# Patient Record
Sex: MALE | Race: WHITE | Employment: OTHER | URBAN - METROPOLITAN AREA
[De-identification: names, ages, dates, MRNs, and addresses within clinical notes are randomized per-mention and may not be internally consistent; named-entity substitution may affect disease eponyms.]

---

## 2018-04-10 PROCEDURE — 88305 TISSUE EXAM BY PATHOLOGIST: CPT | Performed by: INTERNAL MEDICINE

## 2024-05-15 ENCOUNTER — HOSPITAL ENCOUNTER (OUTPATIENT)
Age: 64
Discharge: HOME | End: 2024-05-15

## 2024-05-15 VITALS
OXYGEN SATURATION: 98 % | SYSTOLIC BLOOD PRESSURE: 127 MMHG | DIASTOLIC BLOOD PRESSURE: 80 MMHG | HEART RATE: 65 BPM | TEMPERATURE: 97 F | RESPIRATION RATE: 18 BRPM

## 2024-05-15 DIAGNOSIS — S61.212A LACERATION OF RIGHT MIDDLE FINGER WITHOUT FOREIGN BODY WITHOUT DAMAGE TO NAIL, INITIAL ENCOUNTER: ICD-10-CM

## 2024-05-15 DIAGNOSIS — Z23 NEED FOR TDAP VACCINATION: ICD-10-CM

## 2024-05-15 DIAGNOSIS — S61.210A LACERATION OF RIGHT INDEX FINGER WITHOUT FOREIGN BODY WITHOUT DAMAGE TO NAIL, INITIAL ENCOUNTER: Primary | ICD-10-CM

## 2024-05-15 PROCEDURE — 12001 RPR S/N/AX/GEN/TRNK 2.5CM/<: CPT | Performed by: PHYSICIAN ASSISTANT

## 2024-05-15 PROCEDURE — 90715 TDAP VACCINE 7 YRS/> IM: CPT | Performed by: PHYSICIAN ASSISTANT

## 2024-05-15 PROCEDURE — 90471 IMMUNIZATION ADMIN: CPT | Performed by: PHYSICIAN ASSISTANT

## 2024-05-15 PROCEDURE — 99213 OFFICE O/P EST LOW 20 MIN: CPT | Performed by: PHYSICIAN ASSISTANT

## 2024-05-15 RX ORDER — LIDOCAINE HYDROCHLORIDE 10 MG/ML
5 INJECTION, SOLUTION EPIDURAL; INFILTRATION; INTRACAUDAL; PERINEURAL ONCE
Status: COMPLETED | OUTPATIENT
Start: 2024-05-15 | End: 2024-05-15

## 2024-05-16 NOTE — ED PROVIDER NOTES
Patient Seen in: [facility]      History     Chief Complaint   Patient presents with    Laceration/Abrasion    Laceration     Stated Complaint:     Subjective:   HPI    Patient is a healthy 64-year-old male that presents to immediate care due to lacerations that he sustained prior to arrival.  Patient states that he was putting a  away that accidentally turned on.  States that he cut his right second and third digit on the blade.  Denies limited range of motion or strength.  Last Tdap in 2009.    Objective:   Past Medical History:    Hyperlipidemia    Thyroid disease              History reviewed. No pertinent surgical history.             Social History     Socioeconomic History    Marital status:    Tobacco Use    Smoking status: Never     Passive exposure: Never    Smokeless tobacco: Never   Vaping Use    Vaping status: Never Used              Review of Systems    Positive for stated complaint:   Other systems are as noted in HPI.  Constitutional and vital signs reviewed.      All other systems reviewed and negative except as noted above.    Physical Exam     ED Triage Vitals [05/15/24 1927]   /80   Pulse 65   Resp 18   Temp 97.3 °F (36.3 °C)   Temp src Temporal   SpO2 98 %   O2 Device None (Room air)       Current Vitals:   Vital Signs  BP: 127/80  Pulse: 65  Resp: 18  Temp: 97.3 °F (36.3 °C)  Temp src: Temporal    Oxygen Therapy  SpO2: 98 %  O2 Device: None (Room air)            Physical Exam    Vital signs reviewed. Nursing note reviewed.  Constitutional: Well-developed. Well-nourished. In no acute distress  HENT: Mucous membranes moist.   EYES: No scleral icterus or conjunctival injection.  NECK: Full ROM. Supple.   CARDIAC: Normal rate.   PULM/CHEST: . No wheezes  Extremities: Full ROM of right hand and fingers.  Full flexion extension.  2 cm linear laceration over the palmar aspect of the proximal phalanx of the second and third digit.  No active bleeding foreign  body appreciated.  NEURO: Awake, alert, following commands, moving extremities, answering questions.   SKIN: Warm and dry. No rash or lesions.  PSYCH: Normal judgment. Normal affect.               MDM      Patient is a healthy 64-year-old male that presents to immediate care due to lacerations that he sustained prior to arrival on his second and third digit of his right hand.  Patient arrives with stable vitals.  Physical exam showing 2 cm well-approximated laceration over the proximal phalanx, palmar aspect.  Considered hand x-ray however unlikely underlying fracture or dislocation as patient has full range of motion strength of the fingers without pain.  Less likely tendon injury.  Lacerations were cleaned with iodine pressure wash.  Anesthesia was obtained using 1% lidocaine on second and third digit.  Lacerations were repaired with 4-0 nylon.  3 simple interrupted sutures on second digit and 2 simple interrupted sutures on third digit.  Lacerations were bandaged with antibiotic ointment, nonadhesive and Ace bandage.  Patient tolerated procedure well with no immediate complications.  History given by patient.  Tdap updated prior to discharge.                                   Medical Decision Making      Disposition and Plan     Clinical Impression:  1. Laceration of right index finger without foreign body without damage to nail, initial encounter    2. Laceration of right middle finger without foreign body without damage to nail, initial encounter    3. Need for Tdap vaccination         Disposition:  Discharge  5/15/2024  7:51 pm    Follow-up:  Case Armijo MD  [address] [place] [id number]  [phone]    Call       [facility] [address] [id number] [phone]  In 1 week  For suture removal          Medications Prescribed:  Discharge Medication List as of 5/15/2024  7:55 PM                                          English

## 2024-05-24 ENCOUNTER — HOSPITAL ENCOUNTER (OUTPATIENT)
Age: 64
Discharge: HOME | End: 2024-05-24

## 2024-05-24 VITALS
RESPIRATION RATE: 16 BRPM | DIASTOLIC BLOOD PRESSURE: 73 MMHG | SYSTOLIC BLOOD PRESSURE: 115 MMHG | TEMPERATURE: 97 F | HEART RATE: 58 BPM | OXYGEN SATURATION: 100 %

## 2024-05-24 DIAGNOSIS — Z48.02 ENCOUNTER FOR REMOVAL OF SUTURES: Primary | ICD-10-CM

## 2024-05-24 PROCEDURE — 99024 POSTOP FOLLOW-UP VISIT: CPT | Performed by: NURSE PRACTITIONER

## 2024-05-24 NOTE — ED PROVIDER NOTES
Patient Seen in: [facility]      History     Chief Complaint   Patient presents with    Suture Removal     Stated Complaint: stiches removal    Subjective:   HPI    64-year-old male presents to immediate care for suture removal.  Patient had sutures placed in the palmar aspect of right #2 and #3 fingers 9 days ago.  Wounds are well-healed.    Objective:   Past Medical History:    Hyperlipidemia    Thyroid disease              History reviewed. No pertinent surgical history.             Social History     Socioeconomic History    Marital status:    Tobacco Use    Smoking status: Never     Passive exposure: Never    Smokeless tobacco: Never   Vaping Use    Vaping status: Never Used              Review of Systems    Positive for stated complaint: stiches removal  Other systems are as noted in HPI.  Constitutional and vital signs reviewed.      All other systems reviewed and negative except as noted above.    Physical Exam     ED Triage Vitals [05/24/24 0810]   /73   Pulse 58   Resp 16   Temp 97.3 °F (36.3 °C)   Temp src Temporal   SpO2 100 %   O2 Device None (Room air)       Current Vitals:   Vital Signs  BP: 115/73  Pulse: 58  Resp: 16  Temp: 97.3 °F (36.3 °C)  Temp src: Temporal    Oxygen Therapy  SpO2: 100 %  O2 Device: None (Room air)            Physical Exam  Vitals reviewed.   Constitutional:       General: He is not in acute distress.  Cardiovascular:      Rate and Rhythm: Normal rate and regular rhythm.   Pulmonary:      Effort: Pulmonary effort is normal.      Breath sounds: Normal breath sounds.   Musculoskeletal:         General: No swelling or tenderness.   Skin:     General: Skin is warm and dry.      Comments: Intact sutures R 2nd and 3rd fingers   Neurological:      General: No focal deficit present.      Mental Status: He is alert and oriented to person, place, and time.   Psychiatric:         Mood and Affect: Mood normal.         Behavior: Behavior normal.               ED  Course   Labs Reviewed - No data to display                   MDM                                         Medical Decision Making  64-year-old male needs sutures removed.  Patient has intact sutures right second and third fingers.  Wounds are well-healed.  There is no erythema or drainage.  2 sutures removed from right third finger.  3 sutures removed from right index finger.  Patient was given instructions for wound care.    Risk  OTC drugs.        Disposition and Plan     Clinical Impression:  1. Encounter for removal of sutures         Disposition:  Discharge  5/24/2024  8:20 am    Follow-up:  Case Armijo MD  [address] [place] [id number] [phone]      If symptoms worsen          Medications Prescribed:  Current Discharge Medication List

## 2024-05-24 NOTE — ED INITIAL ASSESSMENT (HPI)
Patient requesting stitch removal from right hand second finger stitches that were put in on 5/15/24. Patient denies fevers, swelling, redness or pain.